# Patient Record
(demographics unavailable — no encounter records)

---

## 2025-02-26 NOTE — HISTORY OF PRESENT ILLNESS
[FreeTextEntry1] : Patient returns 8 days following double incision top surgery via infra-areolar approach. Denies any significant issues. Drain output is less than 20cc a day on each side.  Denies significant pain.

## 2025-02-26 NOTE — PHYSICAL EXAM
[de-identified] : Dressings, drains removed completely. Incisions intact. No significant areas of fullness, fluctuance, erythema, or ecchymosis.  NACs with appropriate appearance.

## 2025-02-26 NOTE — REASON FOR VISIT
[Post Op: _________] : a [unfilled] post op visit [FreeTextEntry1] : Dos: 02/18/2025; S/P: bilateral subtotal subcutaneous mastectomy via an infra-areolar double incision

## 2025-02-26 NOTE — PHYSICAL EXAM
[de-identified] : Dressings, drains removed completely. Incisions intact. No significant areas of fullness, fluctuance, erythema, or ecchymosis.  NACs with appropriate appearance.

## 2025-03-13 NOTE — REASON FOR VISIT
[Post Op: _________] : a [unfilled] post op visit [FreeTextEntry1] : Dos: 02/18/2025; S/P: bilateral subtotal subcutaneous mastectomy via an infra-areolar double incision.

## 2025-03-13 NOTE — PHYSICAL EXAM
[de-identified] : Good overall symmetry. Incisions intact. Slight hollowness on right. Mild left drain site dimpling. No significant areas of fullness, fluctuance, or erythema.

## 2025-03-13 NOTE — HISTORY OF PRESENT ILLNESS
[FreeTextEntry1] : The patient returns 3 weeks following double incision top surgery via infra-areolar approach. Denies any significant issues. Feeling well overall.  Pathology demonstrated benign findings, reviewed with patient.

## 2025-03-13 NOTE — PHYSICAL EXAM
[de-identified] : Good overall symmetry. Incisions intact. Slight hollowness on right. Mild left drain site dimpling. No significant areas of fullness, fluctuance, or erythema.